# Patient Record
Sex: MALE | Race: WHITE | Employment: OTHER | URBAN - NONMETROPOLITAN AREA
[De-identification: names, ages, dates, MRNs, and addresses within clinical notes are randomized per-mention and may not be internally consistent; named-entity substitution may affect disease eponyms.]

---

## 2018-10-18 ENCOUNTER — HOSPITAL ENCOUNTER (OUTPATIENT)
Dept: INTERVENTIONAL RADIOLOGY/VASCULAR | Age: 73
Discharge: HOME | End: 2018-10-18
Payer: MEDICARE

## 2018-10-18 DIAGNOSIS — G45.3 AMAUROSIS FUGAX: ICD-10-CM

## 2018-10-18 PROCEDURE — 93880 EXTRACRANIAL BILAT STUDY: CPT

## 2018-10-24 ENCOUNTER — HOSPITAL ENCOUNTER (OUTPATIENT)
Dept: NON INVASIVE DIAGNOSTICS | Age: 73
Discharge: HOME | End: 2018-10-24
Payer: MEDICARE

## 2018-10-24 LAB
LV EF: 55 %
LVEF MODALITY: NORMAL

## 2018-10-24 PROCEDURE — 93306 TTE W/DOPPLER COMPLETE: CPT

## 2020-10-05 ENCOUNTER — HOSPITAL ENCOUNTER (OUTPATIENT)
Dept: ULTRASOUND IMAGING | Age: 75
Discharge: HOME | End: 2020-10-05
Payer: MEDICARE

## 2020-10-05 PROCEDURE — 76770 US EXAM ABDO BACK WALL COMP: CPT

## 2021-03-18 ENCOUNTER — IMMUNIZATION (OUTPATIENT)
Dept: PRIMARY CARE CLINIC | Age: 76
End: 2021-03-18
Payer: MEDICARE

## 2021-03-18 PROCEDURE — 91300 COVID-19, PFIZER VACCINE 30MCG/0.3ML DOSE: CPT | Performed by: PHARMACIST

## 2021-03-18 PROCEDURE — 0001A COVID-19, PFIZER VACCINE 30MCG/0.3ML DOSE: CPT | Performed by: PHARMACIST

## 2021-04-08 ENCOUNTER — IMMUNIZATION (OUTPATIENT)
Dept: PRIMARY CARE CLINIC | Age: 76
End: 2021-04-08
Payer: MEDICARE

## 2021-04-08 PROCEDURE — 0002A COVID-19, PFIZER VACCINE 30MCG/0.3ML DOSE: CPT

## 2021-04-08 PROCEDURE — 91300 COVID-19, PFIZER VACCINE 30MCG/0.3ML DOSE: CPT

## 2023-05-25 LAB
BASOPHILS ABSOLUTE: 10.7 /ΜL
BASOPHILS RELATIVE PERCENT: 0.6 %
EOSINOPHILS ABSOLUTE: 0.34 /ΜL
EOSINOPHILS RELATIVE PERCENT: 5.5 %
HCT VFR BLD CALC: 42.6 % (ref 41–53)
HEMOGLOBIN: 15 G/DL (ref 13.5–17.5)
LYMPHOCYTES ABSOLUTE: 1.16 /ΜL
LYMPHOCYTES RELATIVE PERCENT: 18.7 %
MCH RBC QN AUTO: 31.4 PG
MCHC RBC AUTO-ENTMCNC: 35.2 G/DL
MCV RBC AUTO: 89.1 FL
MONOCYTES ABSOLUTE: 0.67 /ΜL
MONOCYTES RELATIVE PERCENT: 10.8 %
NEUTROPHILS ABSOLUTE: 3.89 /ΜL
NEUTROPHILS RELATIVE PERCENT: 62.9 %
PDW BLD-RTO: 13.8 %
PLATELET # BLD: 178 K/ΜL
PMV BLD AUTO: NORMAL FL
RBC # BLD: 4.78 10^6/ΜL
WBC # BLD: 6.2 10^3/ML

## 2023-07-13 LAB
BASOPHILS ABSOLUTE: 0 /ΜL
BASOPHILS RELATIVE PERCENT: 0.6 %
EOSINOPHILS ABSOLUTE: 0.3 /ΜL
EOSINOPHILS RELATIVE PERCENT: 3.8 %
HCT VFR BLD CALC: 44.5 % (ref 41–53)
HEMOGLOBIN: 15.5 G/DL (ref 13.5–17.5)
LYMPHOCYTES ABSOLUTE: 1.3 /ΜL
LYMPHOCYTES RELATIVE PERCENT: 19.8 %
MCH RBC QN AUTO: 31.8 PG
MCHC RBC AUTO-ENTMCNC: 34.8 G/DL
MCV RBC AUTO: 91.5 FL
MONOCYTES ABSOLUTE: 0.7 /ΜL
MONOCYTES RELATIVE PERCENT: 9.8 %
NEUTROPHILS ABSOLUTE: 4.5 /ΜL
NEUTROPHILS RELATIVE PERCENT: 66 %
PDW BLD-RTO: 15.3 %
PLATELET # BLD: 160 K/ΜL
PMV BLD AUTO: 8.9 FL
PROSTATE SPECIFIC ANTIGEN: 7.31 NG/ML
RBC # BLD: 4.86 10^6/ΜL
WBC # BLD: 6.7 10^3/ML

## 2023-08-15 ENCOUNTER — HOSPITAL ENCOUNTER (EMERGENCY)
Age: 78
Discharge: HOME | End: 2023-08-15
Payer: MEDICARE

## 2023-08-15 ENCOUNTER — APPOINTMENT (OUTPATIENT)
Dept: CT IMAGING | Age: 78
End: 2023-08-15
Payer: MEDICARE

## 2023-08-15 VITALS
OXYGEN SATURATION: 100 % | TEMPERATURE: 98.4 F | WEIGHT: 149 LBS | SYSTOLIC BLOOD PRESSURE: 144 MMHG | BODY MASS INDEX: 25.58 KG/M2 | HEART RATE: 68 BPM | RESPIRATION RATE: 20 BRPM | DIASTOLIC BLOOD PRESSURE: 85 MMHG

## 2023-08-15 DIAGNOSIS — N20.0 NEPHROLITHIASIS: ICD-10-CM

## 2023-08-15 DIAGNOSIS — N20.1 URETEROLITHIASIS: ICD-10-CM

## 2023-08-15 DIAGNOSIS — R10.9 RIGHT FLANK PAIN: Primary | ICD-10-CM

## 2023-08-15 DIAGNOSIS — N40.0 ENLARGED PROSTATE: ICD-10-CM

## 2023-08-15 LAB
ALBUMIN SERPL BCG-MCNC: 4.2 G/DL (ref 3.5–5.1)
ALP SERPL-CCNC: 79 U/L (ref 38–126)
ALT SERPL W/O P-5'-P-CCNC: 32 U/L (ref 11–66)
ANION GAP SERPL CALC-SCNC: 11 MEQ/L (ref 8–16)
AST SERPL-CCNC: 23 U/L (ref 5–40)
BASOPHILS ABSOLUTE: 0.1 THOU/MM3 (ref 0–0.1)
BASOPHILS NFR BLD AUTO: 0.7 %
BILIRUB SERPL-MCNC: 0.8 MG/DL (ref 0.3–1.2)
BILIRUB UR QL STRIP.AUTO: NEGATIVE
BUN SERPL-MCNC: 19 MG/DL (ref 7–22)
CALCIUM SERPL-MCNC: 9.2 MG/DL (ref 8.5–10.5)
CHARACTER UR: CLEAR
CHLORIDE SERPL-SCNC: 103 MEQ/L (ref 98–111)
CO2 SERPL-SCNC: 27 MEQ/L (ref 23–33)
COLOR: YELLOW
CREAT SERPL-MCNC: 0.9 MG/DL (ref 0.4–1.2)
DEPRECATED RDW RBC AUTO: 42.8 FL (ref 35–45)
EOSINOPHIL NFR BLD AUTO: 1.6 %
EOSINOPHILS ABSOLUTE: 0.1 THOU/MM3 (ref 0–0.4)
ERYTHROCYTE [DISTWIDTH] IN BLOOD BY AUTOMATED COUNT: 13 % (ref 11.5–14.5)
GFR SERPL CREATININE-BSD FRML MDRD: > 60 ML/MIN/1.73M2
GLUCOSE SERPL-MCNC: 109 MG/DL (ref 70–108)
GLUCOSE UR QL STRIP.AUTO: NEGATIVE MG/DL
HCT VFR BLD AUTO: 46.5 % (ref 42–52)
HGB BLD-MCNC: 16 GM/DL (ref 14–18)
HGB UR QL STRIP.AUTO: NEGATIVE
IMM GRANULOCYTES # BLD AUTO: 0.08 THOU/MM3 (ref 0–0.07)
IMM GRANULOCYTES NFR BLD AUTO: 1.1 %
KETONES UR QL STRIP.AUTO: NEGATIVE
LYMPHOCYTES ABSOLUTE: 1.1 THOU/MM3 (ref 1–4.8)
LYMPHOCYTES NFR BLD AUTO: 14.6 %
MCH RBC QN AUTO: 31.5 PG (ref 26–33)
MCHC RBC AUTO-ENTMCNC: 34.4 GM/DL (ref 32.2–35.5)
MCV RBC AUTO: 91.5 FL (ref 80–94)
MONOCYTES ABSOLUTE: 0.7 THOU/MM3 (ref 0.4–1.3)
MONOCYTES NFR BLD AUTO: 9.8 %
NEUTROPHILS NFR BLD AUTO: 72.2 %
NITRITE UR QL STRIP: NEGATIVE
NRBC BLD AUTO-RTO: 0 /100 WBC
OSMOLALITY SERPL CALC.SUM OF ELEC: 284.1 MOSMOL/KG (ref 275–300)
PH UR STRIP.AUTO: 7 [PH] (ref 5–9)
PLATELET # BLD AUTO: 245 THOU/MM3 (ref 130–400)
PMV BLD AUTO: 9.9 FL (ref 9.4–12.4)
POTASSIUM SERPL-SCNC: 3.8 MEQ/L (ref 3.5–5.2)
PROT SERPL-MCNC: 7.2 G/DL (ref 6.1–8)
PROT UR STRIP.AUTO-MCNC: NEGATIVE MG/DL
PSA SERPL-MCNC: 8.28 NG/ML (ref 0–1)
RBC # BLD AUTO: 5.08 MILL/MM3 (ref 4.7–6.1)
SEGMENTED NEUTROPHILS ABSOLUTE COUNT: 5.4 THOU/MM3 (ref 1.8–7.7)
SODIUM SERPL-SCNC: 141 MEQ/L (ref 135–145)
SP GR UR REFRACT.AUTO: 1.01 (ref 1–1.03)
UROBILINOGEN, URINE: 0.2 EU/DL (ref 0–1)
WBC # BLD AUTO: 7.5 THOU/MM3 (ref 4.8–10.8)
WBC #/AREA URNS HPF: NEGATIVE /[HPF]

## 2023-08-15 PROCEDURE — 85025 COMPLETE CBC W/AUTO DIFF WBC: CPT

## 2023-08-15 PROCEDURE — 36415 COLL VENOUS BLD VENIPUNCTURE: CPT

## 2023-08-15 PROCEDURE — 80053 COMPREHEN METABOLIC PANEL: CPT

## 2023-08-15 PROCEDURE — 74176 CT ABD & PELVIS W/O CONTRAST: CPT

## 2023-08-15 PROCEDURE — 84153 ASSAY OF PSA TOTAL: CPT

## 2023-08-15 PROCEDURE — 96374 THER/PROPH/DIAG INJ IV PUSH: CPT

## 2023-08-15 PROCEDURE — 81003 URINALYSIS AUTO W/O SCOPE: CPT

## 2023-08-15 PROCEDURE — 6370000000 HC RX 637 (ALT 250 FOR IP): Performed by: NURSE PRACTITIONER

## 2023-08-15 PROCEDURE — 6360000002 HC RX W HCPCS: Performed by: NURSE PRACTITIONER

## 2023-08-15 PROCEDURE — 99284 EMERGENCY DEPT VISIT MOD MDM: CPT

## 2023-08-15 PROCEDURE — 2580000003 HC RX 258: Performed by: NURSE PRACTITIONER

## 2023-08-15 RX ORDER — TAMSULOSIN HYDROCHLORIDE 0.4 MG/1
0.4 CAPSULE ORAL DAILY
Qty: 5 CAPSULE | Refills: 0 | Status: SHIPPED | OUTPATIENT
Start: 2023-08-15 | End: 2023-08-16 | Stop reason: SDUPTHER

## 2023-08-15 RX ORDER — 0.9 % SODIUM CHLORIDE 0.9 %
1000 INTRAVENOUS SOLUTION INTRAVENOUS ONCE
Status: COMPLETED | OUTPATIENT
Start: 2023-08-15 | End: 2023-08-15

## 2023-08-15 RX ORDER — TAMSULOSIN HYDROCHLORIDE 0.4 MG/1
0.4 CAPSULE ORAL ONCE
Status: COMPLETED | OUTPATIENT
Start: 2023-08-15 | End: 2023-08-15

## 2023-08-15 RX ORDER — KETOROLAC TROMETHAMINE 10 MG/1
10 TABLET, FILM COATED ORAL 2 TIMES DAILY PRN
Qty: 6 TABLET | Refills: 0 | Status: SHIPPED | OUTPATIENT
Start: 2023-08-15 | End: 2023-08-22

## 2023-08-15 RX ORDER — KETOROLAC TROMETHAMINE 30 MG/ML
15 INJECTION, SOLUTION INTRAMUSCULAR; INTRAVENOUS ONCE
Status: COMPLETED | OUTPATIENT
Start: 2023-08-15 | End: 2023-08-15

## 2023-08-15 RX ORDER — HYDROCODONE BITARTRATE AND ACETAMINOPHEN 5; 325 MG/1; MG/1
1 TABLET ORAL EVERY 8 HOURS PRN
Qty: 6 TABLET | Refills: 0 | Status: SHIPPED | OUTPATIENT
Start: 2023-08-15 | End: 2023-08-16 | Stop reason: SDUPTHER

## 2023-08-15 RX ADMIN — KETOROLAC TROMETHAMINE 15 MG: 30 INJECTION, SOLUTION INTRAMUSCULAR; INTRAVENOUS at 13:50

## 2023-08-15 RX ADMIN — TAMSULOSIN HYDROCHLORIDE 0.4 MG: 0.4 CAPSULE ORAL at 13:51

## 2023-08-15 RX ADMIN — SODIUM CHLORIDE 1000 ML: 9 INJECTION, SOLUTION INTRAVENOUS at 13:51

## 2023-08-15 ASSESSMENT — PAIN SCALES - GENERAL
PAINLEVEL_OUTOF10: 2
PAINLEVEL_OUTOF10: 6

## 2023-08-15 ASSESSMENT — PAIN - FUNCTIONAL ASSESSMENT: PAIN_FUNCTIONAL_ASSESSMENT: 0-10

## 2023-08-15 NOTE — ED TRIAGE NOTES
Pt arrives to ED from home with c/o right flank pain that started Friday, pt reports it is intermittent.    States he has history of kidney stones in past  Reports decreased output, but increased frequency  Pt also states he had covid 2 weeks ago, is recovering well

## 2023-08-15 NOTE — DISCHARGE INSTRUCTIONS
Follow up with your urologist or in the Urology Clinic - call for an appointment. For pain use acetaminophen (Tylenol) or ibuprofen (Motrin / Advil), unless prescribed medications that have acetaminophen or ibuprofen (or similar medications) in it. You can take over the counter acetaminophen tablets (1 - 2 tablets of the 500-mg strength every 6 hours) or ibuprofen tablets (2 tablets every 4 hours). Drink plenty of water. Strain your urine for any stones (collect the stones and take them with you to the urologist    [address] [place] [address] for worsening symptoms, inability to urinate, worsening of blood in your urine, or if you develop any concerning symptoms such as: high fever not relieved by acetaminophen (Tylenol) and/or ibuprofen (Motrin / Advil), chills, shortness of breath, chest pain, feeling of your heart fluttering or racing, persistent nausea and/or vomiting, vomiting up blood, blood in your stool, numbness, loss of consciousness, weakness or tingling in the arms or legs or change in color of the extremities, changes in mental status, persistent headache, blurry vision, loss of bladder / bowel control, unable to follow up with your physician, or other any other care or concern.

## 2023-08-16 ENCOUNTER — PREP FOR PROCEDURE (OUTPATIENT)
Dept: UROLOGY | Age: 78
End: 2023-08-16

## 2023-08-16 ENCOUNTER — HOSPITAL ENCOUNTER (OUTPATIENT)
Age: 78
Discharge: HOME | End: 2023-08-16
Payer: MEDICARE

## 2023-08-16 ENCOUNTER — TELEPHONE (OUTPATIENT)
Dept: UROLOGY | Age: 78
End: 2023-08-16

## 2023-08-16 ENCOUNTER — HOSPITAL ENCOUNTER (OUTPATIENT)
Dept: GENERAL RADIOLOGY | Age: 78
Discharge: HOME | End: 2023-08-16
Payer: MEDICARE

## 2023-08-16 ENCOUNTER — OFFICE VISIT (OUTPATIENT)
Dept: UROLOGY | Age: 78
End: 2023-08-16
Payer: MEDICARE

## 2023-08-16 VITALS — BODY MASS INDEX: 25.95 KG/M2 | WEIGHT: 152 LBS | HEIGHT: 64 IN | TEMPERATURE: 98 F

## 2023-08-16 DIAGNOSIS — N20.0 KIDNEY STONE: ICD-10-CM

## 2023-08-16 DIAGNOSIS — R10.9 RIGHT FLANK PAIN: ICD-10-CM

## 2023-08-16 DIAGNOSIS — N20.0 KIDNEY STONE: Primary | ICD-10-CM

## 2023-08-16 DIAGNOSIS — Z01.818 PRE-OP TESTING: ICD-10-CM

## 2023-08-16 DIAGNOSIS — N20.1 URETEROLITHIASIS: ICD-10-CM

## 2023-08-16 DIAGNOSIS — N40.1 BENIGN PROSTATIC HYPERPLASIA WITH LOWER URINARY TRACT SYMPTOMS, SYMPTOM DETAILS UNSPECIFIED: ICD-10-CM

## 2023-08-16 DIAGNOSIS — N20.0 NEPHROLITHIASIS: ICD-10-CM

## 2023-08-16 DIAGNOSIS — R97.20 ELEVATED PSA: ICD-10-CM

## 2023-08-16 LAB
EKG ATRIAL RATE: 58 BPM
EKG P AXIS: 0 DEGREES
EKG P-R INTERVAL: 138 MS
EKG Q-T INTERVAL: 424 MS
EKG QRS DURATION: 86 MS
EKG QTC CALCULATION (BAZETT): 416 MS
EKG R AXIS: -4 DEGREES
EKG T AXIS: 53 DEGREES
EKG VENTRICULAR RATE: 58 BPM

## 2023-08-16 PROCEDURE — G8419 CALC BMI OUT NRM PARAM NOF/U: HCPCS | Performed by: NURSE PRACTITIONER

## 2023-08-16 PROCEDURE — 93005 ELECTROCARDIOGRAM TRACING: CPT

## 2023-08-16 PROCEDURE — 71046 X-RAY EXAM CHEST 2 VIEWS: CPT

## 2023-08-16 PROCEDURE — G8427 DOCREV CUR MEDS BY ELIG CLIN: HCPCS | Performed by: NURSE PRACTITIONER

## 2023-08-16 PROCEDURE — 93010 ELECTROCARDIOGRAM REPORT: CPT | Performed by: INTERNAL MEDICINE

## 2023-08-16 PROCEDURE — 99204 OFFICE O/P NEW MOD 45 MIN: CPT | Performed by: NURSE PRACTITIONER

## 2023-08-16 PROCEDURE — 1123F ACP DISCUSS/DSCN MKR DOCD: CPT | Performed by: NURSE PRACTITIONER

## 2023-08-16 PROCEDURE — 1036F TOBACCO NON-USER: CPT | Performed by: NURSE PRACTITIONER

## 2023-08-16 RX ORDER — MELOXICAM 15 MG/1
15 TABLET ORAL DAILY PRN
COMMUNITY

## 2023-08-16 RX ORDER — HYDROCODONE BITARTRATE AND ACETAMINOPHEN 5; 325 MG/1; MG/1
1 TABLET ORAL EVERY 8 HOURS PRN
Qty: 6 TABLET | Refills: 0 | Status: SHIPPED | OUTPATIENT
Start: 2023-08-16 | End: 2023-08-18

## 2023-08-16 RX ORDER — MECLIZINE HCL 25MG 25 MG/1
25 TABLET, CHEWABLE ORAL DAILY PRN
COMMUNITY

## 2023-08-16 RX ORDER — TAMSULOSIN HYDROCHLORIDE 0.4 MG/1
0.4 CAPSULE ORAL DAILY
Qty: 30 CAPSULE | Refills: 0 | Status: SHIPPED | OUTPATIENT
Start: 2023-08-16 | End: 2023-09-15

## 2023-08-16 NOTE — PROGRESS NOTES
[address]  [facility] [address]  Dept: [phone]  Loc: [phone]    Visit Date: 8/16/2023        HPI:     Lito Arcos is a 70 y.o. male who presents today for:  Chief Complaint   Patient presents with    Nephrolithiasis    Follow-up     ER follow up, enlarged prostate. Patient unable to urinate at moment. HPI  New patient presents to urology clinic for ER follow-up. Stacia Uribe was seen in ER yesterday with c/o right side flank pain. He does have a history of kidney stones. CT revealed a 7 x 9 mm obstructing calculus in the proximal right ureter. Crt 0.9, WBC 7.5. Hgb 16, U/a unremarkable. No previous ureteroscopic procedure noted. Reports of controlled pain, currently taking toradol. Denies suprapubic pressure, gross hematuria, dysuria, fever or chills. PSA has been elevated. No family history prostate cancer. He reports mild LUTS (weak stream) not bothersome at this time. Non smoker. Current Outpatient Medications   Medication Sig Dispense Refill    tamsulosin (FLOMAX) 0.4 MG capsule Take 1 capsule by mouth daily for 30 doses 30 capsule 0    HYDROcodone-acetaminophen (NORCO) 5-325 MG per tablet Take 1 tablet by mouth every 8 hours as needed for Pain for up to 2 days. Max Daily Amount: 3 tablets 6 tablet 0    ketorolac (TORADOL) 10 MG tablet Take 1 tablet by mouth 2 times daily as needed for Pain 6 tablet 0    omeprazole (PRILOSEC) 10 MG capsule Take 1 capsule by mouth daily      aspirin 325 MG tablet Take 1 tablet by mouth every other day      FISH OIL by Does not apply route daily. No current facility-administered medications for this visit. Past Medical History  Stacia Uribe  has a past medical history of GERD (gastroesophageal reflux disease). Past Surgical History  The patient  has no past surgical history on file.     Family History  This patient's family history includes Cancer in his

## 2023-08-16 NOTE — TELEPHONE ENCOUNTER
SURGERY [address] [facility], [address] [facility] [address]      Phone *[phone] *[phone]   Surgical Scheduling Direct Line Phone *[phone] Fax *[phone]      Lu Celis 1945 male    [address]  [address] [address]  Marital Status:          Home Phone: [phone]      Cell Phone:    Telephone Information:   Mobile [phone]          Surgeon: Dr. Haydee Cannon        Surgery Date: 8/22/23   Time: 10:30 am    Procedure: Cystolitholapaxy, Cystoscopy, right ureteroscopy, laser lithotripsy, basket retrieval of stone fragments, and possible right ureteral stent placement     Diagnosis: Kidney Stone     Important Medical History:  In [facility]    Special Inst/Equip:     CPT Codes:    61348,91940  Latex Allergy: No     Cardiac Device:  No    Anesthesia:  General          Admission Type:  Same Day                        Admit Prior to Day of Surgery: No    Case Location:  Main OR            Preadmission Testing:  Phone Call          PAT Date and Time:______________________________________________________    PAT Confirmation #: ______________________________________________________    Post Op Visit: ___________________________________________________________    Need Preop Cardiac Clearance: No    Does Patient have Cardiologist/physician?      None    Surgery Confirmation #: __________________________________________________    : ________________________   Date: __________________________     [employer] Name: Medicare

## 2023-08-16 NOTE — PROGRESS NOTES
Message Juana with Dr Bhavin Bernard office in regards to  does he needs to see PCP for clearance? You was aware pt had COVID on 7/23/23, correct? He isn't having any symptoms at this time of covid. \"

## 2023-08-16 NOTE — TELEPHONE ENCOUNTER
Patient is scheduled for surgery with  on 8/22/23. Surgery consent to be done on arrival. Patient states he/she does not follow with a cardiologist. Patient to do pre op EKG and Chest Xray now. Surgery instructions gone over with patient verbally in the office or mailed to the patient. Patient informed an adult over the age of 25 must be with them at the time of surgery, upon discharge and at home for 24 hours after the procedure. Patient is  but his wife does not drive. They will be coming by cab with the wife present for discharge to ride in the cab with the patient.  Spoke with PAT and they stated that was ok

## 2023-08-16 NOTE — FLOWSHEET NOTE
Follow all instructions given by your physician  NPO after midnight  Sips of water am of surgery with allowed medications  Bring insurance info and 's license  Wear clean comfortable , loose-fitting clothing  No jewelry or contact lenses to be worn day of surgery  No glue on dentures morning of surgery; you will be asked to remove them for surgery. Case for glasses. Shower the night before and the morning of surgery with a liquid antibacterial soap, dry with new fresh clean towel after each shower, no lotions, creams or powder. Clean sheets and pillowcase on bed night before surgery  Bring medications in original bottles  Bring CPAP/BIPAP machine if you have one ( you may be charged if one is needed in recovery room ) no pacemaker no     Our pharmacy has a Meds to [facility] program where they will deliver any new prescriptions you may have to your room before you leave. Our Pharmacy will clear it through your insurance; for example (same co pay). This enables you to take your new RX as soon as you need when you get home and avoids stop/wait delays on the way home. Please have a form of payment with you and have someone designated as your Pharmacy contact with their phone # as you may not feel well or still be under the influence of anesthesia. Please refer to the SSI-Surgical Site Infection Flyer you hopefully received in the mail-together we can prevent infections; signs and symptoms reviewed. When discharged be sure you understand how to care for your wound and that you have the Dr./office phone # to call if you have any concerns or questions about your wound.      needed at discharge and someone over 25 to stay with you for 24 hours overnight (surgery may be cancelled if you don't have this) wife   Report to [facility] on 2nd floor  If you would become ill prior to surgery, please call the surgeon  May have a visitor with you, we request that you limit to 2 visitors in pre-op area  Masks are recommended but

## 2023-08-16 NOTE — TELEPHONE ENCOUNTER
DO NOT TAKE  FISH OIL, MOBIC, IBUPROFEN, MOTRIN-LIKE DRUGS AND ANY MULTIVITAMINS OR OVER THE COUNTER SUPPLEMENTS 14 DAYS PRIOR TO SURGERY. HOLD ASPIRIN 5 DAYS PRIOR TO SURGERY    MUST HAVE AN ADULT OVER THE AGE OF 18 WITH YOU AT THE TIME OF THE DISCHARGE AND WITH YOU AT HOME AFTER THE PROCEDURE FOR 24 HOURS          Rhonda Garnica 1945 Diagnosis:     Surgical Physician: Dr. Castro Shepherd have been scheduled for the procedure marked below:      Surgery: Cystolitholapaxy, Cystoscopy, right ureteroscopy, laser lithotripsy, basket retrieval of stone fragments, and possible right ureteral stent placement          Date: 8/22/23     Anesthesia: Anesthesiologist (General/Spinal)     Place of Service: [facility] Second Floor Same Day Surgery         Arrive to same day surgery at:  8:00 am  (Surgery time is subject to change)      INSTRUCTIONS AS MARKED BELOW:    1.  DO NOT eat or drink anything after midnight before surgery. 2.  We prefer you shower or bathe with an antibacterial soap (Dial) the morning of surgery. 3  Please bring a current medication list, photo ID and insurance card(s) with you  4. Okay to take Tylenol  5. If you take Glucophage, Metformin or Janumet, hold 48-hours prior to surgery  6  Take blood pressure or heart medication as directed, if taken in the morning take with a small sip of water  7. The office will call you in 1-2 days after your procedure to schedule a follow up. DATE SENSITIVE TESTING-DO ON THE DATE LISTED *WALK IN *NO APPOINTMENT. DO THE PRE OP EKG AND CHEST XRAY NOW.  ORDERS GIVEN        Date: 8/16/2023

## 2023-08-22 ENCOUNTER — HOSPITAL ENCOUNTER (OUTPATIENT)
Age: 78
Setting detail: OUTPATIENT SURGERY
Discharge: HOME | End: 2023-08-22
Attending: UROLOGY | Admitting: UROLOGY
Payer: MEDICARE

## 2023-08-22 ENCOUNTER — ANESTHESIA (OUTPATIENT)
Dept: OPERATING ROOM | Age: 78
End: 2023-08-22
Payer: MEDICARE

## 2023-08-22 ENCOUNTER — ANESTHESIA EVENT (OUTPATIENT)
Dept: OPERATING ROOM | Age: 78
End: 2023-08-22
Payer: MEDICARE

## 2023-08-22 VITALS
WEIGHT: 151.4 LBS | DIASTOLIC BLOOD PRESSURE: 72 MMHG | SYSTOLIC BLOOD PRESSURE: 158 MMHG | HEART RATE: 69 BPM | TEMPERATURE: 96.9 F | BODY MASS INDEX: 25.85 KG/M2 | HEIGHT: 64 IN | RESPIRATION RATE: 16 BRPM | OXYGEN SATURATION: 96 %

## 2023-08-22 DIAGNOSIS — R10.9 FLANK PAIN: Primary | ICD-10-CM

## 2023-08-22 PROCEDURE — 6360000002 HC RX W HCPCS: Performed by: NURSE ANESTHETIST, CERTIFIED REGISTERED

## 2023-08-22 PROCEDURE — 7100000011 HC PHASE II RECOVERY - ADDTL 15 MIN: Performed by: UROLOGY

## 2023-08-22 PROCEDURE — 2580000003 HC RX 258: Performed by: UROLOGY

## 2023-08-22 PROCEDURE — 2709999900 HC NON-CHARGEABLE SUPPLY: Performed by: UROLOGY

## 2023-08-22 PROCEDURE — 6360000002 HC RX W HCPCS: Performed by: UROLOGY

## 2023-08-22 PROCEDURE — 3700000001 HC ADD 15 MINUTES (ANESTHESIA): Performed by: UROLOGY

## 2023-08-22 PROCEDURE — C1758 CATHETER, URETERAL: HCPCS | Performed by: UROLOGY

## 2023-08-22 PROCEDURE — 3700000000 HC ANESTHESIA ATTENDED CARE: Performed by: UROLOGY

## 2023-08-22 PROCEDURE — C1747 HC ENDOSCOPE, SINGLE, URINARY TRACT: HCPCS | Performed by: UROLOGY

## 2023-08-22 PROCEDURE — 3600000013 HC SURGERY LEVEL 3 ADDTL 15MIN: Performed by: UROLOGY

## 2023-08-22 PROCEDURE — 7100000010 HC PHASE II RECOVERY - FIRST 15 MIN: Performed by: UROLOGY

## 2023-08-22 PROCEDURE — C2617 STENT, NON-COR, TEM W/O DEL: HCPCS | Performed by: UROLOGY

## 2023-08-22 PROCEDURE — 7100000000 HC PACU RECOVERY - FIRST 15 MIN: Performed by: UROLOGY

## 2023-08-22 PROCEDURE — 3600000003 HC SURGERY LEVEL 3 BASE: Performed by: UROLOGY

## 2023-08-22 PROCEDURE — 7100000001 HC PACU RECOVERY - ADDTL 15 MIN: Performed by: UROLOGY

## 2023-08-22 PROCEDURE — 2720000010 HC SURG SUPPLY STERILE: Performed by: UROLOGY

## 2023-08-22 PROCEDURE — C1769 GUIDE WIRE: HCPCS | Performed by: UROLOGY

## 2023-08-22 DEVICE — URETERAL STENT
Type: IMPLANTABLE DEVICE | Status: FUNCTIONAL
Brand: PERCUFLEX™ PLUS

## 2023-08-22 RX ORDER — PHENAZOPYRIDINE HYDROCHLORIDE 200 MG/1
200 TABLET, FILM COATED ORAL 3 TIMES DAILY PRN
Qty: 9 TABLET | Refills: 0 | Status: SHIPPED | OUTPATIENT
Start: 2023-08-22

## 2023-08-22 RX ORDER — FENTANYL CITRATE 50 UG/ML
INJECTION, SOLUTION INTRAMUSCULAR; INTRAVENOUS PRN
Status: DISCONTINUED | OUTPATIENT
Start: 2023-08-22 | End: 2023-08-22 | Stop reason: SDUPTHER

## 2023-08-22 RX ORDER — PROPOFOL 10 MG/ML
INJECTION, EMULSION INTRAVENOUS PRN
Status: DISCONTINUED | OUTPATIENT
Start: 2023-08-22 | End: 2023-08-22 | Stop reason: SDUPTHER

## 2023-08-22 RX ORDER — SODIUM CHLORIDE 0.9 % (FLUSH) 0.9 %
5-40 SYRINGE (ML) INJECTION EVERY 12 HOURS SCHEDULED
Status: DISCONTINUED | OUTPATIENT
Start: 2023-08-22 | End: 2023-08-22 | Stop reason: HOSPADM

## 2023-08-22 RX ORDER — SODIUM CHLORIDE 9 MG/ML
INJECTION, SOLUTION INTRAVENOUS PRN
Status: DISCONTINUED | OUTPATIENT
Start: 2023-08-22 | End: 2023-08-22 | Stop reason: HOSPADM

## 2023-08-22 RX ORDER — SODIUM CHLORIDE 0.9 % (FLUSH) 0.9 %
5-40 SYRINGE (ML) INJECTION PRN
Status: DISCONTINUED | OUTPATIENT
Start: 2023-08-22 | End: 2023-08-22 | Stop reason: HOSPADM

## 2023-08-22 RX ORDER — DEXAMETHASONE SODIUM PHOSPHATE 10 MG/ML
INJECTION, EMULSION INTRAMUSCULAR; INTRAVENOUS PRN
Status: DISCONTINUED | OUTPATIENT
Start: 2023-08-22 | End: 2023-08-22 | Stop reason: SDUPTHER

## 2023-08-22 RX ORDER — SODIUM CHLORIDE 0.9 % (FLUSH) 0.9 %
5-40 SYRINGE (ML) INJECTION EVERY 12 HOURS SCHEDULED
Status: CANCELLED | OUTPATIENT
Start: 2023-08-22

## 2023-08-22 RX ORDER — OXYCODONE HYDROCHLORIDE AND ACETAMINOPHEN 5; 325 MG/1; MG/1
1 TABLET ORAL EVERY 6 HOURS PRN
Qty: 20 TABLET | Refills: 0 | Status: SHIPPED | OUTPATIENT
Start: 2023-08-22 | End: 2023-08-27

## 2023-08-22 RX ORDER — LIDOCAINE HCL/PF 100 MG/5ML
SYRINGE (ML) INJECTION PRN
Status: DISCONTINUED | OUTPATIENT
Start: 2023-08-22 | End: 2023-08-22 | Stop reason: SDUPTHER

## 2023-08-22 RX ORDER — SODIUM CHLORIDE 0.9 % (FLUSH) 0.9 %
5-40 SYRINGE (ML) INJECTION PRN
Status: CANCELLED | OUTPATIENT
Start: 2023-08-22

## 2023-08-22 RX ORDER — TAMSULOSIN HYDROCHLORIDE 0.4 MG/1
0.4 CAPSULE ORAL DAILY
Qty: 90 CAPSULE | Refills: 3 | Status: SHIPPED | OUTPATIENT
Start: 2023-08-22

## 2023-08-22 RX ORDER — CIPROFLOXACIN 500 MG/1
500 TABLET, FILM COATED ORAL 2 TIMES DAILY
Qty: 6 TABLET | Refills: 0 | Status: SHIPPED | OUTPATIENT
Start: 2023-08-22 | End: 2023-08-25

## 2023-08-22 RX ORDER — SODIUM CHLORIDE 9 MG/ML
INJECTION, SOLUTION INTRAVENOUS PRN
Status: CANCELLED | OUTPATIENT
Start: 2023-08-22

## 2023-08-22 RX ORDER — ONDANSETRON 2 MG/ML
INJECTION INTRAMUSCULAR; INTRAVENOUS PRN
Status: DISCONTINUED | OUTPATIENT
Start: 2023-08-22 | End: 2023-08-22 | Stop reason: SDUPTHER

## 2023-08-22 RX ADMIN — PHENYLEPHRINE HYDROCHLORIDE 150 MCG: 10 INJECTION INTRAVENOUS at 11:45

## 2023-08-22 RX ADMIN — SODIUM CHLORIDE: 9 INJECTION, SOLUTION INTRAVENOUS at 09:00

## 2023-08-22 RX ADMIN — DEXAMETHASONE SODIUM PHOSPHATE 10 MG: 10 INJECTION, EMULSION INTRAMUSCULAR; INTRAVENOUS at 11:28

## 2023-08-22 RX ADMIN — PROPOFOL 200 MG: 10 INJECTION, EMULSION INTRAVENOUS at 11:21

## 2023-08-22 RX ADMIN — FENTANYL CITRATE 50 MCG: 50 INJECTION, SOLUTION INTRAMUSCULAR; INTRAVENOUS at 11:21

## 2023-08-22 RX ADMIN — Medication 80 MG: at 11:21

## 2023-08-22 RX ADMIN — ONDANSETRON 4 MG: 2 INJECTION INTRAMUSCULAR; INTRAVENOUS at 11:58

## 2023-08-22 RX ADMIN — FENTANYL CITRATE 50 MCG: 50 INJECTION, SOLUTION INTRAMUSCULAR; INTRAVENOUS at 11:32

## 2023-08-22 RX ADMIN — Medication 2000 MG: at 11:15

## 2023-08-22 ASSESSMENT — PAIN SCALES - GENERAL
PAINLEVEL_OUTOF10: 0

## 2023-08-22 NOTE — PROGRESS NOTES
1206- pt to pacu. Vss pt appears in no acute distress. 1211- pt remains resting in bed eyes closed. Vss    1218- pt urinated in urinal pt denies pain, nausea. 1222- pt talkative, resting in bed eyes open. Vss.     1236- pt meets criteria for discharge from pacu. 1241- Returned to [facility] in stable conditions. No family present.  report given to [employer]

## 2023-08-22 NOTE — PROGRESS NOTES
Patient admitted to [facility] room 17. Bed in low position side rails up call light in reach. Patient denies questions at this time.

## 2023-08-22 NOTE — PROGRESS NOTES
Pt has met discharge criteria and states he is ready for discharge to home. IV removed, gauze and tape applied. Dressed in own clothes and personal belongings gathered. Discharge instructions (with opioid medication education information) given to pt and family; pt and family verbalized understanding of discharge instructions, prescriptions and follow up appointments. Pt transported to discharge lobby by Sandra Harrell Principal [facility] staff.

## 2023-08-22 NOTE — H&P
History and Physical    Patient:  Hilton Gann  MRN: [id number]  YOB: 1945    CHIEF COMPLAINT:   7 x 9 mm obstructing calculus in the proximal right ureter    HISTORY OF PRESENT ILLNESS:   The patient is a 70 y.o. male who presents with as above, here for surgery    Patient's old records, notes and chart reviewed and summarized above. Past Medical History:    Past Medical History:   Diagnosis Date    Arthritis     COVID-19 07/23/2023    GERD (gastroesophageal reflux disease)        Past Surgical History:    Past Surgical History:   Procedure Laterality Date    CYST REMOVAL      REMOVED BACK OF NECK     Medications Prior to Admission:    Prior to Admission medications    Medication Sig Start Date End Date Taking? Authorizing Provider   SUCRALFATE PO Take 1 g by mouth 3 times daily as needed (WITH SEVERE REFLEX)   Yes Historical Provider, MD   meclizine (ANTIVERT) 25 MG CHEW Take 1 tablet by mouth daily as needed (WHEN HE FEELS DIZZY)   Yes Historical Provider, MD   meloxicam (MOBIC) 15 MG tablet Take 1 tablet by mouth daily as needed for Pain   Yes Historical Provider, MD   tamsulosin (FLOMAX) 0.4 MG capsule Take 1 capsule by mouth daily for 30 doses 8/16/23 9/15/23  Phylliss Seip, APRN - CNP   ketorolac (TORADOL) 10 MG tablet Take 1 tablet by mouth 2 times daily as needed for Pain 8/15/23 8/18/23  KODY Quezada CNP   omeprazole (PRILOSEC) 10 MG capsule Take 1 capsule by mouth daily    Historical Provider, MD   aspirin 325 MG tablet Take 1 tablet by mouth every other day    Historical Provider, MD   FISH OIL Take 1 tablet by mouth daily    Historical Provider, MD       Allergies:  Patient has no known allergies.     Social History:    Social History     Socioeconomic History    Marital status:      Spouse name: Not on file    Number of children: Not on file    Years of education: Not on file    Highest education level: Not on file   Occupational History    Not on file

## 2023-08-22 NOTE — PROGRESS NOTES
[facility]. Vitals and assessment as charted. 0.9 infusing, @400ml to count from PACU. Pt has sabrina delgado and ginger ale. Pt verbalized understanding of discharge criteria and call light use. Call light in reach.

## 2023-08-22 NOTE — ANESTHESIA POSTPROCEDURE EVALUATION
Department of Anesthesiology  Postprocedure Note    Patient: Rhonda Garnica  MRN: [id number]  YOB: 1945  Date of evaluation: 8/22/2023      Procedure Summary     Date: 08/22/23 [facility] / [facility] [place]    Anesthesia Start: 1117 Anesthesia Stop: 1214    Procedure: Cystolitholapaxy, Cystoscopy, Right Ureteroscopy, Laser Lithotripsy, Right Ureteral Stent Placement; Bladder stone basket (Right) Diagnosis:       Kidney stone      (Kidney stone [N20.0])    Surgeons: Gabriele Puente MD Responsible Provider: Aftab Aranda DO    Anesthesia Type: general ASA Status: 1          Anesthesia Type: No value filed.     Armando Phase I: Armando Score: 10    Armando Phase II:        Anesthesia Post Evaluation    Patient location during evaluation: PACU  Patient participation: complete - patient participated  Level of consciousness: awake and alert  Pain score: 2  Airway patency: patent  Nausea & Vomiting: no nausea and no vomiting  Complications: no  Cardiovascular status: hemodynamically stable and blood pressure returned to baseline  Respiratory status: spontaneous ventilation, room air and acceptable  Hydration status: stable  Pain management: adequate and satisfactory to patient

## 2023-08-24 ENCOUNTER — TELEPHONE (OUTPATIENT)
Dept: UROLOGY | Age: 78
End: 2023-08-24

## 2023-08-24 NOTE — TELEPHONE ENCOUNTER
Patient does not know if the stent is dislodge. He can not see the stent, but it looks like he can see more string than usual. The string may have came loose from the tape. He does not remember it getting tugged on. Patient has a small amount of leaking that is not continuous. He is able to control the urine. He is not comfortable pulling out the stent. Lab visit scheduled for stent removal. Discharge paper stated 7 day for stent removal.    He does notice some occasional blood in the urine. Reassured he may notice some blood in the urine from the stent. The pain resolves after urination and he has not needed the percocet. Advised to call the office if he can not control the urine and has constant leaking, to increase his fluid intake to keep the urine clear, or if he has any other questions or concerns. Patient underwent Cystolitholapaxy, Cystoscopy, Right Ureteroscopy, Laser Lithotripsy, Right Ureteral Stent Placement; Bladder stone basket on 08/22/2023.

## 2023-08-27 NOTE — OP NOTE
up the bladder stones into small pieces. We used a stone basket to remove these fragments. We then focused our attention on the Right ureteral orifice, which we cannulated with our glidewire, advanced up to renal pelvis. We then used a dual lumen catheter to place a second wire. Once in good position, we advanced our flexible ureteroscope over the working wire to the renal pelvis under direct visualization. We identified the renal calculus and using a 200 micron holmium laser fiber we fragmented the calculus. It was dusted and the fragments appeared to be under 1 millimeter and could pass easily. At this time a complete pyeloscopy was preformed and no other substantial fragments were identified. We withdrew the ureteroscope and visualized the entire ureter. No stone fragments were identified. No damage to the ureter was identified. At this time, over the remaining glidewire, we placed a 6x26 double J ureteral stent in the usual fashion, and we noted appropriate placement in the upper collecting system using fluoroscopy. There was a good curl noted in the bladder. We decided to leave a string at the end of the stent, which was attached with benzoin and steri-strips. The patient's bladder was drained and removed the scope and the procedure was subsequently terminated.     Plan:  Discharge home in good condition  The patient can pull the stent in 5-7 days     Electronically signed by Roni Byrne MD on 8/27/2023 at 7:16 PM

## 2023-08-29 ENCOUNTER — NURSE ONLY (OUTPATIENT)
Dept: UROLOGY | Age: 78
End: 2023-08-29
Payer: MEDICARE

## 2023-08-29 DIAGNOSIS — N20.0 KIDNEY STONE: Primary | ICD-10-CM

## 2023-08-29 PROCEDURE — 99211 OFF/OP EST MAY X REQ PHY/QHP: CPT

## 2023-08-29 NOTE — PROGRESS NOTES
Patient has given me verbal consent to perform string stent removal  Yes      Patient presents today for string stent removal.    DATE OF PROCEDURE:  08/22/23     PREOPERATIVE DIAGNOSIS:  kidney stone/bladder stone     POSTOPERATIVE DIAGNOSIS: same         PROCEDURES PERFORMED:  Cystolitholapaxy > 2.5; bladder stone basketing; cystoscopy right ureteroscopy holmium laser lithotripsy and right ureteral stent placement        The patient has no complaints or signs/symptoms of an infection. Per Dr. Lulu Mancia orders, string stent was removed without difficulty. The patient was instructed to drink plenty of water and told that it is normal to have some discomfort for next 24 hours after stent removal. This should gradually resolve over 1 to 3 days. Please notify the office with symptoms of a urinary infection. Follow up with [facility] on 10/5/23.

## 2023-10-05 ENCOUNTER — TELEPHONE (OUTPATIENT)
Dept: UROLOGY | Age: 78
End: 2023-10-05

## 2023-10-05 ENCOUNTER — OFFICE VISIT (OUTPATIENT)
Dept: UROLOGY | Age: 78
End: 2023-10-05
Payer: MEDICARE

## 2023-10-05 VITALS
BODY MASS INDEX: 25.78 KG/M2 | SYSTOLIC BLOOD PRESSURE: 146 MMHG | DIASTOLIC BLOOD PRESSURE: 84 MMHG | HEIGHT: 64 IN | WEIGHT: 151 LBS

## 2023-10-05 DIAGNOSIS — N40.1 BENIGN PROSTATIC HYPERPLASIA WITH LOWER URINARY TRACT SYMPTOMS, SYMPTOM DETAILS UNSPECIFIED: ICD-10-CM

## 2023-10-05 DIAGNOSIS — R97.20 ELEVATED PSA: ICD-10-CM

## 2023-10-05 DIAGNOSIS — N20.0 KIDNEY STONE: Primary | ICD-10-CM

## 2023-10-05 LAB
BILIRUBIN URINE: NEGATIVE
BLOOD URINE, POC: NEGATIVE
CHARACTER, URINE: CLEAR
COLOR, URINE: YELLOW
GLUCOSE URINE: NEGATIVE MG/DL
KETONES, URINE: ABNORMAL
LEUKOCYTE CLUMPS, URINE: NEGATIVE
NITRITE, URINE: NEGATIVE
PH, URINE: 6 (ref 5–9)
PROTEIN, URINE: NEGATIVE MG/DL
SPECIFIC GRAVITY, URINE: 1.02 (ref 1–1.03)
UROBILINOGEN, URINE: 0.2 EU/DL (ref 0–1)

## 2023-10-05 PROCEDURE — 81003 URINALYSIS AUTO W/O SCOPE: CPT | Performed by: NURSE PRACTITIONER

## 2023-10-05 PROCEDURE — G8484 FLU IMMUNIZE NO ADMIN: HCPCS | Performed by: NURSE PRACTITIONER

## 2023-10-05 PROCEDURE — G8419 CALC BMI OUT NRM PARAM NOF/U: HCPCS | Performed by: NURSE PRACTITIONER

## 2023-10-05 PROCEDURE — 1036F TOBACCO NON-USER: CPT | Performed by: NURSE PRACTITIONER

## 2023-10-05 PROCEDURE — 1123F ACP DISCUSS/DSCN MKR DOCD: CPT | Performed by: NURSE PRACTITIONER

## 2023-10-05 PROCEDURE — 99214 OFFICE O/P EST MOD 30 MIN: CPT | Performed by: NURSE PRACTITIONER

## 2023-10-05 PROCEDURE — G8427 DOCREV CUR MEDS BY ELIG CLIN: HCPCS | Performed by: NURSE PRACTITIONER

## 2023-10-05 RX ORDER — TAMSULOSIN HYDROCHLORIDE 0.4 MG/1
0.4 CAPSULE ORAL DAILY
Qty: 90 CAPSULE | Refills: 3 | Status: SHIPPED | OUTPATIENT
Start: 2023-10-05

## 2023-10-05 NOTE — TELEPHONE ENCOUNTER
Patient scheduled for MRI PROSTATE W WO  at [facility] MR on 10/23/23. Arrival of 330PM for a 4PM scan time.   Order with instructions given to the patient in the office

## 2023-10-05 NOTE — PROGRESS NOTES
Medication list not available to review.
Recent PSA 8.28  - Will proceed with prostate MRI and possible prostate biopsy.   - POCT Urinalysis No Micro (Auto)  - MRI PROSTATE W WO CONTRAST; Future    -Patient has no other questions, comments, or concerns.   -They agree with and understand the plan of care. -The patient was encouraged to call the office or seek emergency care should this change.       KODY Andrews - CNP

## 2023-10-23 ENCOUNTER — HOSPITAL ENCOUNTER (OUTPATIENT)
Dept: MRI IMAGING | Age: 78
Discharge: HOME | End: 2023-10-23
Payer: MEDICARE

## 2023-10-23 DIAGNOSIS — R97.20 ELEVATED PSA: ICD-10-CM

## 2023-10-23 PROCEDURE — 76377 3D RENDER W/INTRP POSTPROCES: CPT

## 2023-10-23 PROCEDURE — A9579 GAD-BASE MR CONTRAST NOS,1ML: HCPCS | Performed by: NURSE PRACTITIONER

## 2023-10-23 PROCEDURE — 72197 MRI PELVIS W/O & W/DYE: CPT

## 2023-10-23 PROCEDURE — 6360000004 HC RX CONTRAST MEDICATION: Performed by: NURSE PRACTITIONER

## 2023-10-23 RX ADMIN — GADOTERIDOL 14 ML: 279.3 INJECTION, SOLUTION INTRAVENOUS at 16:27

## 2023-10-30 ENCOUNTER — OFFICE VISIT (OUTPATIENT)
Dept: UROLOGY | Age: 78
End: 2023-10-30
Payer: MEDICARE

## 2023-10-30 ENCOUNTER — TELEPHONE (OUTPATIENT)
Dept: UROLOGY | Age: 78
End: 2023-10-30

## 2023-10-30 VITALS
WEIGHT: 152 LBS | SYSTOLIC BLOOD PRESSURE: 130 MMHG | DIASTOLIC BLOOD PRESSURE: 70 MMHG | HEIGHT: 64 IN | BODY MASS INDEX: 25.95 KG/M2

## 2023-10-30 DIAGNOSIS — N20.0 KIDNEY STONE: ICD-10-CM

## 2023-10-30 DIAGNOSIS — R97.20 ELEVATED PSA: ICD-10-CM

## 2023-10-30 DIAGNOSIS — N40.1 BENIGN PROSTATIC HYPERPLASIA WITH LOWER URINARY TRACT SYMPTOMS, SYMPTOM DETAILS UNSPECIFIED: Primary | ICD-10-CM

## 2023-10-30 PROCEDURE — G8427 DOCREV CUR MEDS BY ELIG CLIN: HCPCS | Performed by: NURSE PRACTITIONER

## 2023-10-30 PROCEDURE — 1123F ACP DISCUSS/DSCN MKR DOCD: CPT | Performed by: NURSE PRACTITIONER

## 2023-10-30 PROCEDURE — G8419 CALC BMI OUT NRM PARAM NOF/U: HCPCS | Performed by: NURSE PRACTITIONER

## 2023-10-30 PROCEDURE — 1036F TOBACCO NON-USER: CPT | Performed by: NURSE PRACTITIONER

## 2023-10-30 PROCEDURE — G8484 FLU IMMUNIZE NO ADMIN: HCPCS | Performed by: NURSE PRACTITIONER

## 2023-10-30 PROCEDURE — 99214 OFFICE O/P EST MOD 30 MIN: CPT | Performed by: NURSE PRACTITIONER

## 2023-10-30 RX ORDER — CIPROFLOXACIN 500 MG/1
500 TABLET, FILM COATED ORAL 2 TIMES DAILY
Qty: 6 TABLET | Refills: 0 | Status: SHIPPED | OUTPATIENT
Start: 2023-10-30 | End: 2023-11-02

## 2023-10-30 NOTE — TELEPHONE ENCOUNTER
MRI FUSION GUIDED PROSTATE ULTRASOUND/BIOPSY WITH DR Ngozi Singh      1945    You are scheduled for the above procedure on:    11/2/23   at:    1:00 PM  Your follow up appointment for your biopsy results is on:    11/16/23     At:   2:30 PM    Please note that you will be responsible for any charges that are not paid by your insurance. The prostate biopsy specimens are sent to a Lab and their charges are billed to you separate from the office charges. DESCRIPTION OF PROSTATIC ULTRASOUND AND BIOPSY  Ultrasound uses harmless sound waves to give us pictures of the prostate, and allows us to accurately guide a biopsy needle to areas of concern. The procedure is done in the office. Initially, a complete prostate exam is done. Next the ultrasound probe, finger-like in size and shape, is placed into the rectum. With slight movement of the probe, many different views are obtained. A prostate block may or may not be given at this time. A spring loaded fine needle is place through the probe and directed into the prostate. Six or more biopsies are usually taken. These biopsies are not usually painful. The entire exam takes 20-30 minutes. POSSIBLE RISKS OF THE PROCEDURE  Blood may be noted in the stool and urine for a few days. Blood may be seen in the semen for up to a month. Infection of the prostate or in the urine can occur even with antibiotic preparation. You should call if you develop fever, chills, severe pain, or have continuous or significant bleeding. If you have known hemorrhoids, you may have more bleeding and discomfort in the rectal area following the biopsy. This may last for 3-5 days afterwards. If any concerns arise, please call the office.     PREPARATION** PLEASE EAT A REGULAR LUNCH OR BREAKFAST**    1.  DO NOT TAKE: ASPIRIN, MOTRIN,  IBUPROFEN, MOBIC/ MELOXICAM, COUMADIN( WARFARIN) FISH OIL, AND VITAMIN E FOR 5 DAYS BEFORE THE BIOPSY AND 3 DAYS AFTER THE

## 2023-10-30 NOTE — PATIENT INSTRUCTIONS
Take antibiotic the day before, the day fo and day after prostate biopsy. Call sooner with any questions or concerns.

## 2023-10-30 NOTE — PROGRESS NOTES
[address]  [place] [address]  Dept: [phone]  Loc: [phone]    Visit Date: 10/30/2023        HPI:     Jessica Benson is a 70 y.o. male who presents today for:  Chief Complaint   Patient presents with    Nephrolithiasis    Benign Prostatic Hypertrophy    Elevated PSA    Follow-up     Review mri and psa       HPI  Patient presents to urology clinic with elevated PSA and BPH. Sherry Conroy reports doing well. Denies flank pain, suprapubic pressure, gross hematuria, dysuria, fever or chills. He reports stopped Flomax but would like to restart. IPSS 10  PSA has been elevated. No family history prostate cancer. He reports mild LUTS (weak stream) this improved with Flomax. Non smoker. Current Outpatient Medications   Medication Sig Dispense Refill    ciprofloxacin (CIPRO) 500 MG tablet Take 1 tablet by mouth 2 times daily for 3 days Starting on the day before biopsy. Continue day of and day after. 6 tablet 0    omeprazole (PRILOSEC) 10 MG capsule Take 1 capsule by mouth daily      FISH OIL Take 1 tablet by mouth daily      tamsulosin (FLOMAX) 0.4 MG capsule Take 1 capsule by mouth daily (Patient not taking: Reported on 10/30/2023) 90 capsule 3    SUCRALFATE PO Take 1 g by mouth 3 times daily as needed (WITH SEVERE REFLEX) (Patient not taking: Reported on 10/5/2023)      meclizine (ANTIVERT) 25 MG CHEW Take 1 tablet by mouth daily as needed (WHEN HE FEELS DIZZY) (Patient not taking: Reported on 10/5/2023)      meloxicam (MOBIC) 15 MG tablet Take 1 tablet by mouth daily as needed for Pain (Patient not taking: Reported on 10/5/2023)       No current facility-administered medications for this visit. Past Medical History  Sherry Conroy  has a past medical history of Arthritis, COVID-19, and GERD (gastroesophageal reflux disease).     Past Surgical History  The patient  has a past surgical history that includes cyst removal and
detailed exam Optional:[id number]}       {Time Documentation Optional:[id number]}    --Tanner Rizvi, APRN - CNP

## 2023-11-02 ENCOUNTER — PROCEDURE VISIT (OUTPATIENT)
Dept: UROLOGY | Age: 78
End: 2023-11-02

## 2023-11-02 VITALS — BODY MASS INDEX: 25.95 KG/M2 | HEIGHT: 64 IN | WEIGHT: 152 LBS

## 2023-11-02 DIAGNOSIS — R97.20 ELEVATED PSA: Primary | ICD-10-CM

## 2023-11-02 RX ORDER — GENTAMICIN SULFATE 40 MG/ML
80 INJECTION, SOLUTION INTRAMUSCULAR; INTRAVENOUS ONCE
Status: COMPLETED | OUTPATIENT
Start: 2023-11-02 | End: 2023-11-02

## 2023-11-02 RX ADMIN — GENTAMICIN SULFATE 80 MG: 40 INJECTION, SOLUTION INTRAMUSCULAR; INTRAVENOUS at 14:24

## 2023-11-02 NOTE — PROGRESS NOTES
Procedure: Trus/Biopsy-Uronav   Pt name and birth date verified Yes  Patient agrees Dr. Lisa Gomez is taking biopsies of the prostate Yes  Patient took Enema 2 hours prior to procedure Yes  Patient took 2 pill(s) of cipro day before procedure, day of, and will the day after Yes  Has patient eaten today? Yes  Patient stopped all blood thinners prior to surgery Yes    Patient has given me verbal consent to perform Gentamicin Injection  Yes    Following Dr. Zechariah Pabon of care. Gentamicin 80MG/2ML GIVEN I.M right UOQ HIP  Lot Number: [id number]  Expiration Date: 07/24 [address]  #: [id number]    Tobramycin supplied by office.

## 2023-11-02 NOTE — PROGRESS NOTES
Mr. Neto Veras was seen in follow up for his prostate biopsy. The biopsy was indicated and is being performed for abnormal MRI. The biopsy is being done with MRI / Ultrasound fusion using the UroNav system. The biopsy was done without difficulty or complication. TRUS prostate biopsy note:  After obtaining informed consent, the rectal ultrasound probe was passed per rectum and the prostate visualized. A local block was performed by instilling 2% lidocaine at the base. At this point, prostate biopsy was performed. Using Dissolve, the ultrasound and MRI images were fused and then biopsies of the lesions identified by the radiologist were taken. Three cores were taken from each of the 5 lesions seen on MRI. Two cores were then  taken at the base, the mid-portion, and the apex of the gland on either side for a total of 12 cores. The rectal probe was removed and there was minimal bleeding. Mr. Neto Veras tolerated the procedure well and there were no complications. Prostate size: 120cc cc    Cores taken:12 + 3 cores each from 5 lesions making 27 total cores  Complications: none        Assessment:      Prostate biopsy performed without difficulty. This was done with UroNav MRI fused guidance. Plan:        I will see Kristi Yanesing back to discuss the pathology in 1-2 weeks. Further recommendations will be based on these results.

## 2023-11-16 ENCOUNTER — OFFICE VISIT (OUTPATIENT)
Dept: UROLOGY | Age: 78
End: 2023-11-16
Payer: MEDICARE

## 2023-11-16 VITALS — BODY MASS INDEX: 26.63 KG/M2 | WEIGHT: 156 LBS | HEIGHT: 64 IN | RESPIRATION RATE: 16 BRPM

## 2023-11-16 DIAGNOSIS — R97.20 ELEVATED PSA: Primary | ICD-10-CM

## 2023-11-16 DIAGNOSIS — N20.0 NEPHROLITHIASIS: ICD-10-CM

## 2023-11-16 DIAGNOSIS — N13.8 BPH WITH OBSTRUCTION/LOWER URINARY TRACT SYMPTOMS: ICD-10-CM

## 2023-11-16 DIAGNOSIS — N40.1 BPH WITH OBSTRUCTION/LOWER URINARY TRACT SYMPTOMS: ICD-10-CM

## 2023-11-16 PROCEDURE — 1036F TOBACCO NON-USER: CPT | Performed by: UROLOGY

## 2023-11-16 PROCEDURE — G8484 FLU IMMUNIZE NO ADMIN: HCPCS | Performed by: UROLOGY

## 2023-11-16 PROCEDURE — 99214 OFFICE O/P EST MOD 30 MIN: CPT | Performed by: UROLOGY

## 2023-11-16 PROCEDURE — G8427 DOCREV CUR MEDS BY ELIG CLIN: HCPCS | Performed by: UROLOGY

## 2023-11-16 PROCEDURE — 1123F ACP DISCUSS/DSCN MKR DOCD: CPT | Performed by: UROLOGY

## 2023-11-16 PROCEDURE — G8419 CALC BMI OUT NRM PARAM NOF/U: HCPCS | Performed by: UROLOGY

## 2023-11-16 NOTE — PROGRESS NOTES
Gold Arnold MD   Urology Clinic Office visit      Patient:  Lu Celis  YOB: 1945  Date: 11/16/2023    HISTORY OF PRESENT ILLNESS:   The patient is a 70 y.o. male who presents today for follow-up for the following problem(s):      1. Elevated PSA    2. Nephrolithiasis    3. BPH with obstruction/lower urinary tract symptoms           Overall the problem(s) : are worsening. Associated Symptoms: No dysuria, gross hematuria. Pain Severity:      Summary of old records: N/A    Additional History:   11/2/2023  Prostate size: 120cc cc    Cores taken:12 + 3 cores each from 5 lesions making 27 total cores  Complications: none      Negative for cancer      I independently reviewed and verified the images and reports from:    MRI PROSTATE W WO CONTRAST    Result Date: 10/24/2023  PROCEDURE: MRI PROSTATE W WO CONTRAST CLINICAL INFORMATION: Elevated PSA 70year-old patient status post left cystolitholapaxy. Bladder stone basket. Right ureteral stent placement. COMPARISON: CT scan of the abdomen and pelvis dated 15th of August 2023. TECHNIQUE: Axial T2, coronal T2 and sagittal T2 imaging of the prostate gland. Large field of view axial T2 imaging of the prostate gland. Axial T1, axial T1 VIBE dynamic post che and axial T1 VIBE dynamic subtracted post che images through the prostate gland. Diffusion 50, 800, 1400 and ADC maps through the prostate gland. Axial T1 STAR VIBE post che through the pelvis. 3-D images reconstructed on a separate [employer] Cad workstation with MRI TRUS fusion of prostate mass lesions. CONTRAST: 14 mL of ProHance. LABORATORY: 1. 7/18/2023. PSA measures 7.31. 2. 8/15/2023. PSA measures 8.28. Vick Dumont FINDINGS: PROSTATE SIZE: 1. The prostate gland is enlarged measuring 6.1 x 5.6 x 7.2 cm in size. 2. The prostate volume is 123.40 ml. TRANSITIONAL ZONE: 1. Changes of benign prostatic hyperplasia 2. 1.4 x 1.2 cm area of abnormal signal intensity in the right transitional zone.  PI-RADS 4. 3. 1.3 x

## 2024-02-15 ENCOUNTER — HOSPITAL ENCOUNTER (OUTPATIENT)
Age: 79
Discharge: HOME | End: 2024-02-15
Payer: MEDICARE

## 2024-02-15 DIAGNOSIS — R97.20 ELEVATED PSA: Primary | ICD-10-CM

## 2024-02-15 DIAGNOSIS — R97.20 ELEVATED PSA: ICD-10-CM

## 2024-02-15 LAB — PSA SERPL-MCNC: 10.97 NG/ML (ref 0–1)

## 2024-02-15 PROCEDURE — 84153 ASSAY OF PSA TOTAL: CPT

## 2024-02-15 PROCEDURE — 36415 COLL VENOUS BLD VENIPUNCTURE: CPT

## 2024-02-15 NOTE — PROGRESS NOTES
[facility] [place] [facility]  [facility] UROLOG[facility]  [address]  [address]  [place] [address]  Dept: [phone]  Loc: [phone]    Visit Date: 2/16/2024        HPI:     Joe Mai is a 78 y.o. male who presents today for:  Chief Complaint   Patient presents with    Benign Prostatic Hypertrophy    Follow-up     3 month follow up, psa done prior    Elevated PSA       HPI  Patient presents to urology clinic for follow-up.     Joe reports doing well. LUTS controlled with flomax. Denies flank pain, suprapubic pressure, gross hematuria, dysuria, fever or chills.     PSA increased. Negative biopsy in November.    Additional History:   11/2/2023  Prostate size: 120cc cc     Cores taken:12 + 3 cores each from 5 lesions making 27 total cores  Complications: none    Negative for cancer    Current Outpatient Medications   Medication Sig Dispense Refill    tamsulosin (FLOMAX) 0.4 MG capsule Take 1 capsule by mouth daily 90 capsule 3    omeprazole (PRILOSEC) 10 MG capsule Take 1 capsule by mouth daily      FISH OIL Take 1 tablet by mouth daily      SUCRALFATE PO Take 1 g by mouth 3 times daily as needed (WITH SEVERE REFLEX) (Patient not taking: Reported on 10/5/2023)      meclizine (ANTIVERT) 25 MG CHEW Take 1 tablet by mouth daily as needed (WHEN HE FEELS DIZZY) (Patient not taking: Reported on 10/5/2023)      meloxicam (MOBIC) 15 MG tablet Take 1 tablet by mouth daily as needed for Pain (Patient not taking: Reported on 10/5/2023)       No current facility-administered medications for this visit.       Past Medical History  Joe  has a past medical history of Arthritis, COVID-19, and GERD (gastroesophageal reflux disease).    Past Surgical History  The patient  has a past surgical history that includes cyst removal; Cystoscopy (Right, 08/22/2023); and Prostate biopsy (11/02/2023).    Family History  This patient's family history includes Cancer in his maternal grandfather; Heart Disease in

## 2024-02-16 ENCOUNTER — OFFICE VISIT (OUTPATIENT)
Dept: UROLOGY | Age: 79
End: 2024-02-16
Payer: MEDICARE

## 2024-02-16 VITALS — HEIGHT: 64 IN | BODY MASS INDEX: 29.71 KG/M2 | RESPIRATION RATE: 16 BRPM | WEIGHT: 174 LBS

## 2024-02-16 DIAGNOSIS — R97.20 ELEVATED PSA: Primary | ICD-10-CM

## 2024-02-16 DIAGNOSIS — N40.1 BPH WITH OBSTRUCTION/LOWER URINARY TRACT SYMPTOMS: ICD-10-CM

## 2024-02-16 DIAGNOSIS — N20.0 NEPHROLITHIASIS: ICD-10-CM

## 2024-02-16 DIAGNOSIS — N13.8 BPH WITH OBSTRUCTION/LOWER URINARY TRACT SYMPTOMS: ICD-10-CM

## 2024-02-16 PROCEDURE — G8427 DOCREV CUR MEDS BY ELIG CLIN: HCPCS | Performed by: NURSE PRACTITIONER

## 2024-02-16 PROCEDURE — 1123F ACP DISCUSS/DSCN MKR DOCD: CPT | Performed by: NURSE PRACTITIONER

## 2024-02-16 PROCEDURE — 1036F TOBACCO NON-USER: CPT | Performed by: NURSE PRACTITIONER

## 2024-02-16 PROCEDURE — G8484 FLU IMMUNIZE NO ADMIN: HCPCS | Performed by: NURSE PRACTITIONER

## 2024-02-16 PROCEDURE — 99213 OFFICE O/P EST LOW 20 MIN: CPT | Performed by: NURSE PRACTITIONER

## 2024-02-16 PROCEDURE — G8419 CALC BMI OUT NRM PARAM NOF/U: HCPCS | Performed by: NURSE PRACTITIONER

## 2024-05-16 ENCOUNTER — HOSPITAL ENCOUNTER (OUTPATIENT)
Age: 79
Discharge: HOME | End: 2024-05-16
Payer: MEDICARE

## 2024-05-16 DIAGNOSIS — R97.20 ELEVATED PSA: ICD-10-CM

## 2024-05-16 LAB — PSA SERPL-MCNC: 5.85 NG/ML (ref 0–1)

## 2024-05-16 PROCEDURE — 36415 COLL VENOUS BLD VENIPUNCTURE: CPT

## 2024-05-16 PROCEDURE — 84153 ASSAY OF PSA TOTAL: CPT

## 2024-05-20 ENCOUNTER — OFFICE VISIT (OUTPATIENT)
Dept: UROLOGY | Age: 79
End: 2024-05-20
Payer: MEDICARE

## 2024-05-20 VITALS — HEIGHT: 64 IN | WEIGHT: 159 LBS | RESPIRATION RATE: 14 BRPM | BODY MASS INDEX: 27.14 KG/M2

## 2024-05-20 DIAGNOSIS — R97.20 ELEVATED PSA: Primary | ICD-10-CM

## 2024-05-20 DIAGNOSIS — N20.0 NEPHROLITHIASIS: ICD-10-CM

## 2024-05-20 PROCEDURE — 1036F TOBACCO NON-USER: CPT

## 2024-05-20 PROCEDURE — G8419 CALC BMI OUT NRM PARAM NOF/U: HCPCS

## 2024-05-20 PROCEDURE — G8427 DOCREV CUR MEDS BY ELIG CLIN: HCPCS

## 2024-05-20 PROCEDURE — 1123F ACP DISCUSS/DSCN MKR DOCD: CPT

## 2024-05-20 PROCEDURE — 99214 OFFICE O/P EST MOD 30 MIN: CPT

## 2024-05-20 NOTE — PATIENT INSTRUCTIONS
Continue Flomax  KUB and PSA in 6 months   Call with questions, comments, or concerns. I recommend going to the ED for further evaluation if you develop fever, chills, nausea, vomiting, chest pain, SOB, or calf pain.

## 2024-05-20 NOTE — PROGRESS NOTES
BUN/Creatinine:  Lab Results   Component Value Date/Time    BUN 19 08/15/2023 01:15 PM    CREATININE 0.9 08/15/2023 01:15 PM       Radiology  No updated urologic imaging for review       Assessment:   Elevated PSA  BPH with lower urinary tract symptoms  Bilateral Nephrolithiasis  Hx of bladder stones     Plan:       Elevated PSA: PSA has decreased a bit. Negative bx in 11/2023. Will re-check PSA in 6 months. Discussed need for repeat work-up should PSA acutely rise.     BPH with lower urinary tract symptoms: Continue Flomax 0.4 mg daily.     Bilateral Nephrolithiasis: Will check KUB to assess stone burden\    Follow-up in 6 months with KUB and PSA.         Merline Wolfe PA-C  Urology

## 2024-11-20 ENCOUNTER — HOSPITAL ENCOUNTER (OUTPATIENT)
Dept: GENERAL RADIOLOGY | Age: 79
Discharge: HOME | End: 2024-11-20
Payer: MEDICARE

## 2024-11-20 ENCOUNTER — HOSPITAL ENCOUNTER (OUTPATIENT)
Age: 79
Discharge: HOME | End: 2024-11-20
Payer: MEDICARE

## 2024-11-20 DIAGNOSIS — R97.20 ELEVATED PSA: ICD-10-CM

## 2024-11-20 DIAGNOSIS — N20.0 NEPHROLITHIASIS: ICD-10-CM

## 2024-11-20 LAB — PSA SERPL-MCNC: 6.7 NG/ML (ref 0–1)

## 2024-11-20 PROCEDURE — 74018 RADEX ABDOMEN 1 VIEW: CPT

## 2024-11-20 PROCEDURE — 84153 ASSAY OF PSA TOTAL: CPT

## 2024-11-20 PROCEDURE — 36415 COLL VENOUS BLD VENIPUNCTURE: CPT

## 2024-12-02 ENCOUNTER — OFFICE VISIT (OUTPATIENT)
Dept: UROLOGY | Age: 79
End: 2024-12-02
Payer: MEDICARE

## 2024-12-02 VITALS — BODY MASS INDEX: 27.14 KG/M2 | RESPIRATION RATE: 18 BRPM | HEIGHT: 64 IN | WEIGHT: 159 LBS

## 2024-12-02 DIAGNOSIS — N13.8 BPH WITH OBSTRUCTION/LOWER URINARY TRACT SYMPTOMS: ICD-10-CM

## 2024-12-02 DIAGNOSIS — N20.0 NEPHROLITHIASIS: Primary | ICD-10-CM

## 2024-12-02 DIAGNOSIS — N40.1 BPH WITH OBSTRUCTION/LOWER URINARY TRACT SYMPTOMS: ICD-10-CM

## 2024-12-02 LAB
BACTERIA: ABNORMAL
BILIRUB UR QL STRIP: NEGATIVE
BILIRUBIN, URINE: NEGATIVE
BLOOD URINE, POC: NORMAL
CASTS #/AREA URNS LPF: ABNORMAL /LPF
CASTS #/AREA URNS LPF: ABNORMAL /LPF
CHARACTER UR: CLEAR
CHARACTER, URINE: CLEAR
CHARCOAL URNS QL MICRO: ABNORMAL
COLOR UR: YELLOW
COLOR, UA: YELLOW
CRYSTALS URNS QL MICRO: ABNORMAL
EPITHELIAL CELLS, UA: ABNORMAL /HPF
GLUCOSE UR QL STRIP.AUTO: NEGATIVE MG/DL
GLUCOSE URINE: NEGATIVE MG/DL
HGB UR QL STRIP.AUTO: NEGATIVE
KETONES UR QL STRIP.AUTO: ABNORMAL
KETONES, URINE: NEGATIVE
LEUKOCYTE CLUMPS, URINE: NEGATIVE
LEUKOCYTE ESTERASE UR QL STRIP.AUTO: NEGATIVE
NITRITE UR QL STRIP.AUTO: NEGATIVE
NITRITE, URINE: NEGATIVE
PH UR STRIP.AUTO: 5.5 [PH] (ref 5–9)
PH, URINE: 5.5 (ref 5–9)
PROT UR STRIP.AUTO-MCNC: NEGATIVE MG/DL
PROTEIN, URINE: NEGATIVE MG/DL
RBC #/AREA URNS HPF: ABNORMAL /HPF
RENAL EPI CELLS #/AREA URNS HPF: ABNORMAL /[HPF]
SPECIFIC GRAVITY UA: 1.02 (ref 1–1.03)
SPECIFIC GRAVITY UA: >= 1.03 (ref 1–1.03)
UROBILINOGEN, URINE: 0.2 EU/DL (ref 0–1)
UROBILINOGEN, URINE: 0.2 EU/DL (ref 0–1)
WBC #/AREA URNS HPF: ABNORMAL /HPF
YEAST LIKE FUNGI URNS QL MICRO: ABNORMAL

## 2024-12-02 PROCEDURE — 81003 URINALYSIS AUTO W/O SCOPE: CPT

## 2024-12-02 PROCEDURE — 99214 OFFICE O/P EST MOD 30 MIN: CPT

## 2024-12-02 PROCEDURE — G8419 CALC BMI OUT NRM PARAM NOF/U: HCPCS

## 2024-12-02 PROCEDURE — 1036F TOBACCO NON-USER: CPT

## 2024-12-02 PROCEDURE — G8484 FLU IMMUNIZE NO ADMIN: HCPCS

## 2024-12-02 PROCEDURE — G8427 DOCREV CUR MEDS BY ELIG CLIN: HCPCS

## 2024-12-02 PROCEDURE — 1159F MED LIST DOCD IN RCRD: CPT

## 2024-12-02 PROCEDURE — 1123F ACP DISCUSS/DSCN MKR DOCD: CPT

## 2024-12-02 NOTE — PROGRESS NOTES
[facility] [place] [facility]  [facility] UROLOGY  [address]  [address]  [place] [facility] [address]  Dept: [phone]  Loc: [phone]    Visit Date: 12/2/2024        HPI:     HPI  Mr. Mai is a 79-year-old male that presents in follow-up.      Patient with hx of elevated PSA. Prostate MRI in 10/2023 with PI-RADS 3 and PI-RADS 4 findings. Prostate size 120 cc.  Patient subsequently underwent a prostate biopsy in 11/2023. Pathology negative for malignancy. Denies having a family hx of prostate cancer.      Also with hx of kidney stones. CT imaging in 8/2023 noted a 7 x 9 mm obstructing calculus in the proximal right ureter resulting in mild to moderate right-sided hydronephrosis and hydroureter. Nonobstructing calculi in the midpole of the right kidney measures 3 mm and 4 mm. Nonobstructing calculi in the left kidney measures 2 mm and 4 mm. The urinary bladder contains a calculus along the right lateral wall measuring 12 x 11 mm. Patient underwent a cystolitholapaxy, cystoscopy, R URS, HLL, and R ureteral stent placement with Dr. Myles Vasquez in 8/2023.     He also takes Flomax 0.4 mg daily for management of BPH symptomatology.        Current Outpatient Medications   Medication Sig Dispense Refill    tamsulosin (FLOMAX) 0.4 MG capsule Take 1 capsule by mouth daily 90 capsule 3    SUCRALFATE PO Take 1 g by mouth 3 times daily as needed (WITH SEVERE REFLEX)      FISH OIL Take 1 tablet by mouth daily      meclizine (ANTIVERT) 25 MG CHEW Take 1 tablet by mouth daily as needed (WHEN HE FEELS DIZZY) (Patient not taking: Reported on 10/5/2023)      meloxicam (MOBIC) 15 MG tablet Take 1 tablet by mouth daily as needed for Pain (Patient not taking: Reported on 10/5/2023)      omeprazole (PRILOSEC) 10 MG capsule Take 1 capsule by mouth daily (Patient not taking: Reported on 12/2/2024)       No current facility-administered medications for this visit.       Past Medical History  Joe  has a past

## 2024-12-02 NOTE — PATIENT INSTRUCTIONS
PSA and KUB in 1 year  I will send your urine for additional studies and call with abnormal results.   Call with questions, comments, or concerns. I recommend going to the ED for further evaluation if you develop fever, chills, nausea, vomiting, chest pain, SOB, or calf pain.    The medication list included in this document is our record of what you are currently taking, including any changes that were made at today's visit.  If you find any differences when compared to your medications at home, or have any questions that were not answered at your visit, please contact the office.

## 2024-12-04 LAB — BACTERIA UR CULT: NORMAL

## 2025-07-15 ENCOUNTER — TRANSCRIBE ORDERS (OUTPATIENT)
Dept: ADMINISTRATIVE | Age: 80
End: 2025-07-15

## 2025-07-15 DIAGNOSIS — R10.32 ABDOMINAL PAIN, LEFT LOWER QUADRANT: Primary | ICD-10-CM

## 2025-07-15 DIAGNOSIS — R63.4 LOSS OF WEIGHT: ICD-10-CM

## 2025-07-15 DIAGNOSIS — R19.4 FREQUENT BOWEL MOVEMENTS: ICD-10-CM

## 2025-07-21 ENCOUNTER — HOSPITAL ENCOUNTER (OUTPATIENT)
Dept: CT IMAGING | Age: 80
Discharge: HOME | End: 2025-07-21
Attending: FAMILY MEDICINE
Payer: MEDICARE

## 2025-07-21 DIAGNOSIS — R10.32 ABDOMINAL PAIN, LEFT LOWER QUADRANT: ICD-10-CM

## 2025-07-21 DIAGNOSIS — R63.4 LOSS OF WEIGHT: ICD-10-CM

## 2025-07-21 DIAGNOSIS — R19.4 FREQUENT BOWEL MOVEMENTS: ICD-10-CM

## 2025-07-21 LAB — POC CREATININE WHOLE BLOOD: 1 MG/DL (ref 0.5–1.2)

## 2025-07-21 PROCEDURE — 82565 ASSAY OF CREATININE: CPT

## 2025-07-21 PROCEDURE — 6360000004 HC RX CONTRAST MEDICATION: Performed by: FAMILY MEDICINE

## 2025-07-21 PROCEDURE — 74177 CT ABD & PELVIS W/CONTRAST: CPT

## 2025-07-21 RX ORDER — IOPAMIDOL 755 MG/ML
80 INJECTION, SOLUTION INTRAVASCULAR
Status: COMPLETED | OUTPATIENT
Start: 2025-07-21 | End: 2025-07-21

## 2025-07-21 RX ADMIN — IOPAMIDOL 80 ML: 755 INJECTION, SOLUTION INTRAVENOUS at 13:17

## 2025-07-23 ENCOUNTER — TELEPHONE (OUTPATIENT)
Dept: UROLOGY | Age: 80
End: 2025-07-23

## 2025-07-23 NOTE — TELEPHONE ENCOUNTER
Received a referral from Dr. Barcenas's office for kidney stones. He was seen in this office last year on 12/02/24, his next appointment with Mreline is on 12/4/25.     Can someone call him and get him scheduled sooner?

## 2025-08-04 ENCOUNTER — HOSPITAL ENCOUNTER (OUTPATIENT)
Dept: GENERAL RADIOLOGY | Age: 80
Discharge: HOME | End: 2025-08-04
Payer: MEDICARE

## 2025-08-04 ENCOUNTER — HOSPITAL ENCOUNTER (OUTPATIENT)
Dept: OTHER | Age: 80
Discharge: HOME | End: 2025-08-04
Payer: MEDICARE

## 2025-08-04 DIAGNOSIS — N20.0 NEPHROLITHIASIS: Primary | ICD-10-CM

## 2025-08-04 DIAGNOSIS — N20.0 NEPHROLITHIASIS: ICD-10-CM

## 2025-08-04 DIAGNOSIS — R97.20 ELEVATED PSA: ICD-10-CM

## 2025-08-04 LAB — PSA SERPL-MCNC: 7.44 NG/ML (ref 0–1)

## 2025-08-04 PROCEDURE — 74018 RADEX ABDOMEN 1 VIEW: CPT

## 2025-08-04 PROCEDURE — 84153 ASSAY OF PSA TOTAL: CPT

## 2025-08-04 PROCEDURE — 36415 COLL VENOUS BLD VENIPUNCTURE: CPT

## 2025-08-07 ENCOUNTER — TELEPHONE (OUTPATIENT)
Dept: UROLOGY | Age: 80
End: 2025-08-07

## 2025-08-07 ENCOUNTER — OFFICE VISIT (OUTPATIENT)
Dept: UROLOGY | Age: 80
End: 2025-08-07
Payer: MEDICARE

## 2025-08-07 VITALS — DIASTOLIC BLOOD PRESSURE: 72 MMHG | SYSTOLIC BLOOD PRESSURE: 124 MMHG

## 2025-08-07 DIAGNOSIS — N40.1 BPH WITH OBSTRUCTION/LOWER URINARY TRACT SYMPTOMS: ICD-10-CM

## 2025-08-07 DIAGNOSIS — R97.20 ELEVATED PSA: ICD-10-CM

## 2025-08-07 DIAGNOSIS — R10.9 FLANK PAIN: ICD-10-CM

## 2025-08-07 DIAGNOSIS — N20.0 KIDNEY STONE: ICD-10-CM

## 2025-08-07 DIAGNOSIS — N13.8 BPH WITH OBSTRUCTION/LOWER URINARY TRACT SYMPTOMS: ICD-10-CM

## 2025-08-07 DIAGNOSIS — N20.0 NEPHROLITHIASIS: Primary | ICD-10-CM

## 2025-08-07 DIAGNOSIS — N40.1 BENIGN PROSTATIC HYPERPLASIA WITH LOWER URINARY TRACT SYMPTOMS, SYMPTOM DETAILS UNSPECIFIED: ICD-10-CM

## 2025-08-07 DIAGNOSIS — N20.1 URETERAL STONE: ICD-10-CM

## 2025-08-07 PROCEDURE — G8419 CALC BMI OUT NRM PARAM NOF/U: HCPCS | Performed by: UROLOGY

## 2025-08-07 PROCEDURE — 1036F TOBACCO NON-USER: CPT | Performed by: UROLOGY

## 2025-08-07 PROCEDURE — G8428 CUR MEDS NOT DOCUMENT: HCPCS | Performed by: UROLOGY

## 2025-08-07 PROCEDURE — 99214 OFFICE O/P EST MOD 30 MIN: CPT | Performed by: UROLOGY

## 2025-08-07 PROCEDURE — 1123F ACP DISCUSS/DSCN MKR DOCD: CPT | Performed by: UROLOGY

## (undated) DEVICE — SYSTEM PMP SGL ACT W/ 10CC VAC SYR 1 W VLV FOR ENDOSCP SURG

## (undated) DEVICE — DUAL LUMEN URETERAL CATHETER

## (undated) DEVICE — SOLUTION IRRIG 3000ML 0.9% SOD CHL USP UROMATIC PLAS CONT

## (undated) DEVICE — FIBER LASER HOLM DISP SU200RT] LEONI FIBER OPTICS INC]

## (undated) DEVICE — ADAPTER URO SCP UROLOK LL

## (undated) DEVICE — CYSTO: Brand: MEDLINE INDUSTRIES, INC.

## (undated) DEVICE — GUIDEWIRE URO L150CM DIA .035IN STIFF NIT HYDRPHL STR TIP

## (undated) DEVICE — NITINOL STONE RETRIEVAL BASKET: Brand: ZERO TIP

## (undated) DEVICE — SINGLE-USE DIGITAL FLEXIBLE URETEROSCOPE: Brand: LITHOVUE